# Patient Record
Sex: MALE | URBAN - METROPOLITAN AREA
[De-identification: names, ages, dates, MRNs, and addresses within clinical notes are randomized per-mention and may not be internally consistent; named-entity substitution may affect disease eponyms.]

---

## 2017-01-04 ENCOUNTER — IMPORTED ENCOUNTER (OUTPATIENT)
Dept: URBAN - METROPOLITAN AREA CLINIC 88 | Facility: CLINIC | Age: 62
End: 2017-01-04

## 2017-01-11 ENCOUNTER — IMPORTED ENCOUNTER (OUTPATIENT)
Dept: URBAN - METROPOLITAN AREA CLINIC 88 | Facility: CLINIC | Age: 62
End: 2017-01-11

## 2017-01-18 ENCOUNTER — IMPORTED ENCOUNTER (OUTPATIENT)
Dept: URBAN - METROPOLITAN AREA CLINIC 88 | Facility: CLINIC | Age: 62
End: 2017-01-18

## 2018-12-07 ENCOUNTER — OFFICE VISIT (OUTPATIENT)
Dept: INTERNAL MEDICINE CLINIC | Facility: CLINIC | Age: 63
End: 2018-12-07

## 2018-12-07 VITALS
WEIGHT: 242 LBS | SYSTOLIC BLOOD PRESSURE: 160 MMHG | HEIGHT: 77 IN | TEMPERATURE: 98.5 F | HEART RATE: 66 BPM | DIASTOLIC BLOOD PRESSURE: 83 MMHG | BODY MASS INDEX: 28.57 KG/M2 | RESPIRATION RATE: 16 BRPM

## 2018-12-07 DIAGNOSIS — E66.3 OVERWEIGHT (BMI 25.0-29.9): ICD-10-CM

## 2018-12-07 DIAGNOSIS — Z23 ENCOUNTER FOR IMMUNIZATION: ICD-10-CM

## 2018-12-07 DIAGNOSIS — F33.9 EPISODE OF RECURRENT MAJOR DEPRESSIVE DISORDER, UNSPECIFIED DEPRESSION EPISODE SEVERITY (HCC): Primary | ICD-10-CM

## 2018-12-07 DIAGNOSIS — I10 ESSENTIAL HYPERTENSION: ICD-10-CM

## 2018-12-07 RX ORDER — OMEPRAZOLE 40 MG/1
CAPSULE, DELAYED RELEASE ORAL
COMMUNITY
Start: 2017-04-28 | End: 2021-06-16 | Stop reason: ALTCHOICE

## 2018-12-07 RX ORDER — LISINOPRIL 10 MG/1
TABLET ORAL
Qty: 90 TAB | Refills: 2 | Status: SHIPPED | OUTPATIENT
Start: 2018-12-07 | End: 2019-01-16 | Stop reason: DRUGHIGH

## 2018-12-07 RX ORDER — LISINOPRIL 10 MG/1
TABLET ORAL
COMMUNITY
Start: 2017-05-29 | End: 2018-12-07 | Stop reason: SDUPTHER

## 2018-12-07 RX ORDER — BUPROPION HYDROCHLORIDE 300 MG/1
TABLET ORAL
COMMUNITY
Start: 2017-04-03 | End: 2018-12-07 | Stop reason: SDUPTHER

## 2018-12-07 RX ORDER — BUPROPION HYDROCHLORIDE 300 MG/1
TABLET ORAL
Qty: 90 TAB | Refills: 2 | Status: SHIPPED | OUTPATIENT
Start: 2018-12-07 | End: 2019-09-16 | Stop reason: SDUPTHER

## 2018-12-07 NOTE — PATIENT INSTRUCTIONS
DASH Diet: Care Instructions Your Care Instructions The DASH diet is an eating plan that can help lower your blood pressure. DASH stands for Dietary Approaches to Stop Hypertension. Hypertension is high blood pressure. The DASH diet focuses on eating foods that are high in calcium, potassium, and magnesium. These nutrients can lower blood pressure. The foods that are highest in these nutrients are fruits, vegetables, low-fat dairy products, nuts, seeds, and legumes. But taking calcium, potassium, and magnesium supplements instead of eating foods that are high in those nutrients does not have the same effect. The DASH diet also includes whole grains, fish, and poultry. The DASH diet is one of several lifestyle changes your doctor may recommend to lower your high blood pressure. Your doctor may also want you to decrease the amount of sodium in your diet. Lowering sodium while following the DASH diet can lower blood pressure even further than just the DASH diet alone. Follow-up care is a key part of your treatment and safety. Be sure to make and go to all appointments, and call your doctor if you are having problems. It's also a good idea to know your test results and keep a list of the medicines you take. How can you care for yourself at home? Following the DASH diet · Eat 4 to 5 servings of fruit each day. A serving is 1 medium-sized piece of fruit, ½ cup chopped or canned fruit, 1/4 cup dried fruit, or 4 ounces (½ cup) of fruit juice. Choose fruit more often than fruit juice. · Eat 4 to 5 servings of vegetables each day. A serving is 1 cup of lettuce or raw leafy vegetables, ½ cup of chopped or cooked vegetables, or 4 ounces (½ cup) of vegetable juice. Choose vegetables more often than vegetable juice. · Get 2 to 3 servings of low-fat and fat-free dairy each day. A serving is 8 ounces of milk, 1 cup of yogurt, or 1 ½ ounces of cheese. · Eat 6 to 8 servings of grains each day. A serving is 1 slice of bread, 1 ounce of dry cereal, or ½ cup of cooked rice, pasta, or cooked cereal. Try to choose whole-grain products as much as possible. · Limit lean meat, poultry, and fish to 2 servings each day. A serving is 3 ounces, about the size of a deck of cards. · Eat 4 to 5 servings of nuts, seeds, and legumes (cooked dried beans, lentils, and split peas) each week. A serving is 1/3 cup of nuts, 2 tablespoons of seeds, or ½ cup of cooked beans or peas. · Limit fats and oils to 2 to 3 servings each day. A serving is 1 teaspoon of vegetable oil or 2 tablespoons of salad dressing. · Limit sweets and added sugars to 5 servings or less a week. A serving is 1 tablespoon jelly or jam, ½ cup sorbet, or 1 cup of lemonade. · Eat less than 2,300 milligrams (mg) of sodium a day. If you limit your sodium to 1,500 mg a day, you can lower your blood pressure even more. Tips for success · Start small. Do not try to make dramatic changes to your diet all at once. You might feel that you are missing out on your favorite foods and then be more likely to not follow the plan. Make small changes, and stick with them. Once those changes become habit, add a few more changes. · Try some of the following: ? Make it a goal to eat a fruit or vegetable at every meal and at snacks. This will make it easy to get the recommended amount of fruits and vegetables each day. ? Try yogurt topped with fruit and nuts for a snack or healthy dessert. ? Add lettuce, tomato, cucumber, and onion to sandwiches. ? Combine a ready-made pizza crust with low-fat mozzarella cheese and lots of vegetable toppings. Try using tomatoes, squash, spinach, broccoli, carrots, cauliflower, and onions. ? Have a variety of cut-up vegetables with a low-fat dip as an appetizer instead of chips and dip. ? Sprinkle sunflower seeds or chopped almonds over salads.  Or try adding chopped walnuts or almonds to cooked vegetables. ? Try some vegetarian meals using beans and peas. Add garbanzo or kidney beans to salads. Make burritos and tacos with mashed rodriguez beans or black beans. Where can you learn more? Go to http://michelle-allie.info/. Enter H524 in the search box to learn more about \"DASH Diet: Care Instructions. \" Current as of: December 6, 2017 Content Version: 11.8 © 9239-6967 [employer]. Care instructions adapted under license by [employer] (which disclaims liability or warranty for this information). If you have questions about a medical condition or this instruction, always ask your healthcare professional. [address] any warranty or liability for your use of this information. High Blood Pressure: Care Instructions Your Care Instructions If your blood pressure is usually above 130/80, you have high blood pressure, or hypertension. That means the top number is 130 or higher or the bottom number is 80 or higher, or both. Despite what a lot of people think, high blood pressure usually doesn't cause headaches or make you feel dizzy or lightheaded. It usually has no symptoms. But it does increase your risk for heart attack, stroke, and kidney or eye damage. The higher your blood pressure, the more your risk increases. Your doctor will give you a goal for your blood pressure. Your goal will be based on your health and your age. Lifestyle changes, such as eating healthy and being active, are always important to help lower blood pressure. You might also take medicine to reach your blood pressure goal. 
Follow-up care is a key part of your treatment and safety. Be sure to make and go to all appointments, and call your doctor if you are having problems. It's also a good idea to know your test results and keep a list of the medicines you take. How can you care for yourself at home? Medical treatment · If you stop taking your medicine, your blood pressure will go back up. You may take one or more types of medicine to lower your blood pressure. Be safe with medicines. Take your medicine exactly as prescribed. Call your doctor if you think you are having a problem with your medicine. · Talk to your doctor before you start taking aspirin every day. Aspirin can help certain people lower their risk of a heart attack or stroke. But taking aspirin isn't right for everyone, because it can cause serious bleeding. · See your doctor regularly. You may need to see the doctor more often at first or until your blood pressure comes down. · If you are taking blood pressure medicine, talk to your doctor before you take decongestants or anti-inflammatory medicine, such as ibuprofen. Some of these medicines can raise blood pressure. · Learn how to check your blood pressure at home. Lifestyle changes · Stay at a healthy weight. This is especially important if you put on weight around the waist. Losing even 10 pounds can help you lower your blood pressure. · If your doctor recommends it, get more exercise. Walking is a good choice. Bit by bit, increase the amount you walk every day. Try for at least 30 minutes on most days of the week. You also may want to swim, bike, or do other activities. · Avoid or limit alcohol. Talk to your doctor about whether you can drink any alcohol. · Try to limit how much sodium you eat to less than 2,300 milligrams (mg) a day. Your doctor may ask you to try to eat less than 1,500 mg a day. · Eat plenty of fruits (such as bananas and oranges), vegetables, legumes, whole grains, and low-fat dairy products. · Lower the amount of saturated fat in your diet. Saturated fat is found in animal products such as milk, cheese, and meat. Limiting these foods may help you lose weight and also lower your risk for heart disease. · Do not smoke. Smoking increases your risk for heart attack and stroke. If you need help quitting, talk to your doctor about stop-smoking programs and medicines. These can increase your chances of quitting for good. When should you call for help? Call 911 anytime you think you may need emergency care. This may mean having symptoms that suggest that your blood pressure is causing a serious heart or blood vessel problem. Your blood pressure may be over 180/120. 
 For example, call 911 if: 
  · You have symptoms of a heart attack. These may include: 
? Chest pain or pressure, or a strange feeling in the chest. 
? Sweating. ? Shortness of breath. ? Nausea or vomiting. ? Pain, pressure, or a strange feeling in the back, neck, jaw, or upper belly or in one or both shoulders or arms. ? Lightheadedness or sudden weakness. ? A fast or irregular heartbeat.  
  · You have symptoms of a stroke. These may include: 
? Sudden numbness, tingling, weakness, or loss of movement in your face, arm, or leg, especially on only one side of your body. ? Sudden vision changes. ? Sudden trouble speaking. ? Sudden confusion or trouble understanding simple statements. ? Sudden problems with walking or balance. ? A sudden, severe headache that is different from past headaches.  
  · You have severe back or belly pain.  
 Do not wait until your blood pressure comes down on its own. Get help right away. 
 Call your doctor now or seek immediate care if: 
  · Your blood pressure is much higher than normal (such as 180/120 or higher), but you don't have symptoms.  
  · You think high blood pressure is causing symptoms, such as: 
? Severe headache. 
? Blurry vision.  
 Watch closely for changes in your health, and be sure to contact your doctor if: 
  · Your blood pressure measures higher than your doctor recommends at least 2 times. That means the top number is higher or the bottom number is higher, or both.  
  · You think you may be having side effects from your blood pressure medicine. Where can you learn more? Go to http://michelle-allie.info/. Enter A716 in the search box to learn more about \"High Blood Pressure: Care Instructions. \" Current as of: December 6, 2017 Content Version: 11.8 © 2060-8875 [employer]. Care instructions adapted under license by [employer] (which disclaims liability or warranty for this information). If you have questions about a medical condition or this instruction, always ask your healthcare professional. [address] any warranty or liability for your use of this information. Learning About the [address] Diet What is the Mediterranean diet? The Mediterranean diet is a style of eating rather than a diet plan. It features foods eaten in [place], [place], [place], and other countries along the [facility]. It emphasizes eating foods like fish, fruits, vegetables, beans, high-fiber breads and whole grains, nuts, and olive oil. This style of eating includes limited red meat, cheese, and sweets. Why choose the Mediterranean diet? A Mediterranean-style diet may improve heart health. It contains more fat than other heart-healthy diets. But the fats are mainly from nuts, unsaturated oils (such as fish oils and olive oil), and certain nut or seed oils (such as canola, soybean, or flaxseed oil). These fats may help protect the heart and blood vessels. How can you get started on the Mediterranean diet? Here are some things you can do to switch to a more Mediterranean way of eating. What to eat · Eat a variety of fruits and vegetables each day, such as grapes, blueberries, tomatoes, broccoli, peppers, figs, olives, spinach, eggplant, beans, lentils, and chickpeas. · Eat a variety of whole-grain foods each day, such as oats, brown rice, and whole wheat bread, pasta, and couscous. · Eat fish at least 2 times a week.  Try tuna, salmon, mackerel, lake trout, herring, or sardines. · Eat moderate amounts of low-fat dairy products, such as milk, cheese, or yogurt. · Eat moderate amounts of poultry and eggs. · Choose healthy (unsaturated) fats, such as nuts, olive oil, and certain nut or seed oils like canola, soybean, and flaxseed. · Limit unhealthy (saturated) fats, such as butter, palm oil, and coconut oil. And limit fats found in animal products, such as meat and dairy products made with whole milk. Try to eat red meat only a few times a month in very small amounts. · Limit sweets and desserts to only a few times a week. This includes sugar-sweetened drinks like soda. The Mediterranean diet may also include red wine with your meal1 glass each day for women and up to 2 glasses a day for men. Tips for eating at home · Use herbs, spices, garlic, lemon zest, and citrus juice instead of salt to add flavor to foods. · Add avocado slices to your sandwich instead of jaramillo. · Have fish for lunch or dinner instead of red meat. Brush the fish with olive oil, and broil or grill it. · Sprinkle your salad with seeds or nuts instead of cheese. · Cook with olive or canola oil instead of butter or oils that are high in saturated fat. · Switch from 2% milk or whole milk to 1% or fat-free milk. · Dip raw vegetables in a vinaigrette dressing or hummus instead of dips made from mayonnaise or sour cream. 
· Have a piece of fruit for dessert instead of a piece of cake. Try baked apples, or have some dried fruit. Tips for eating out · Try broiled, grilled, baked, or poached fish instead of having it fried or breaded. · Ask your  to have your meals prepared with olive oil instead of butter. · Order dishes made with marinara sauce or sauces made from olive oil. Avoid sauces made from cream or mayonnaise. · Choose whole-grain breads, whole wheat pasta and pizza crust, brown rice, beans, and lentils. · Cut back on butter or margarine on bread. Instead, you can dip your bread in a small amount of olive oil. · Ask for a side salad or grilled vegetables instead of french fries or chips. Where can you learn more? Go to http://michelle-allie.info/. Enter [phone] in the search box to learn more about \"Learning About the Mediterranean Diet. \" Current as of: March 29, 2018 Content Version: 11.8 © 9061-1491 [employer]. Care instructions adapted under license by [employer] (which disclaims liability or warranty for this information). If you have questions about a medical condition or this instruction, always ask your healthcare professional. [address] any warranty or liability for your use of this information. Starting a Weight Loss Plan: Care Instructions Your Care Instructions If you are thinking about losing weight, it can be hard to know where to start. Your doctor can help you set up a weight loss plan that best meets your needs. You may want to take a class on nutrition or exercise, or join a weight loss support group. If you have questions about how to make changes to your eating or exercise habits, ask your doctor about seeing a registered dietitian or an exercise specialist. 
It can be a big challenge to lose weight. But you do not have to make huge changes at once. Make small changes, and stick with them. When those changes become habit, add a few more changes. If you do not think you are ready to make changes right now, try to pick a date in the future. Make an appointment to see your doctor to discuss whether the time is right for you to start a plan. Follow-up care is a key part of your treatment and safety. Be sure to make and go to all appointments, and call your doctor if you are having problems. It's also a good idea to know your test results and keep a list of the medicines you take. How can you care for yourself at home? · Set realistic goals. Many people expect to lose much more weight than is likely. A weight loss of 5% to 10% of your body weight may be enough to improve your health. · Get family and friends involved to provide support. Talk to them about why you are trying to lose weight, and ask them to help. They can help by participating in exercise and having meals with you, even if they may be eating something different. · Find what works best for you. If you do not have time or do not like to cook, a program that offers meal replacement bars or shakes may be better for you. Or if you like to prepare meals, finding a plan that includes daily menus and recipes may be best. 
· Ask your doctor about other health professionals who can help you achieve your weight loss goals. ? A dietitian can help you make healthy changes in your diet. ? An exercise specialist or  can help you develop a safe and effective exercise program. 
? A counselor or psychiatrist can help you cope with issues such as depression, anxiety, or family problems that can make it hard to focus on weight loss. · Consider joining a support group for people who are trying to lose weight. Your doctor can suggest groups in your area. Where can you learn more? Go to http://michelle-allie.info/. Enter U566 in the search box to learn more about \"Starting a Weight Loss Plan: Care Instructions. \" Current as of: June 26, 2018 Content Version: 11.8 © 5849-7975 [employer]wise, [employer]. Care instructions adapted under license by [employer] (which disclaims liability or warranty for this information). If you have questions about a medical condition or this instruction, always ask your healthcare professional. [address] any warranty or liability for your use of this information.

## 2018-12-07 NOTE — PROGRESS NOTES
Subjective:  
  
Juanita Monsalve is a 58 y.o. male who is a new patient and is here to establish care. Previous followed by PCP but he does not remember who. The following sections were reviewed & updated as appropriate: PMH, PL, PSH, FH, RxH, and SH. Mental Health Review Patient is seen for depression, bipolar. He was previously on lithium, celexa, and wellbutrin. He continued only on the wellbutin and states this has controlled his symptoms. Ongoing symptoms include depressed mood. He states he has been off all his meds since the summer. Cardiovascular Review The patient has hypertension. He reports no TIA's, no chest pain on exertion, no dyspnea on exertion, no swelling of ankles, no palpitations. Labs: no lab studies available for review at time of visit, he states he had a physical recently with labs and will get us his records. He states was seen in the past year by a cardiologist and had stress testing, echo done. Patient Active Problem List  
 Diagnosis Date Noted  Episode of recurrent major depressive disorder ([facility] 12/07/2018  Essential hypertension 12/07/2018 Current Outpatient Medications Medication Sig Dispense Refill  omeprazole (PRILOSEC) 40 mg capsule TAKE 1 CAPSULE(S) EVERY DAY BY ORAL ROUTE  DAYS.  buPROPion XL (WELLBUTRIN XL) 300 mg XL tablet TAKE 1 TABLET BY MOUTH EVERY MORNING 90 Tab 2  
 lisinopril (PRINIVIL, ZESTRIL) 10 mg tablet TAKE 1 TABLET(S) EVERY DAY BY ORAL ROUTE. 90 Tab 2 Allergies Allergen Reactions  Pcn [Penicillins] Unknown (comments) History reviewed. No pertinent past medical history. Past Surgical History:  
Procedure Laterality Date  HX COLECTOMY  HX ROTATOR CUFF REPAIR Review of Systems A comprehensive review of systems was negative except for that written in the HPI. Objective:  
 
Visit Vitals /83 (BP 1 Location: Left arm, BP Patient Position: Sitting) Pulse 66  
 Temp 98.5 °F (36.9 °C) (Oral) Resp 16 Ht 6' 5\" (1.956 m) Wt 242 lb (109.8 kg) BMI 28.70 kg/m² General appearance: alert, cooperative, no distress, appears stated age Head: Normocephalic, without obvious abnormality, atraumatic Eyes: negative Back: symmetric, no curvature. ROM normal. No CVA tenderness. Lungs: clear to auscultation bilaterally Heart: regular rate and rhythm, S1, S2 normal, no murmur, click, rub or gallop Extremities: extremities normal, atraumatic, no cyanosis or edema Pulses: 2+ and symmetric Skin: Skin color, texture, turgor normal. No rashes or lesions Neurologic: Alert and oriented X 3, normal strength and tone. Normal symmetric reflexes. Normal coordination and gait Psych: appropriate mood, speech, affect Nursing note and vitals reviewed Assessment/Plan: ICD-10-CM ICD-9-CM 1. Episode of recurrent major depressive disorder, unspecified depression episode severity (HCC) F33.9 296.30 buPROPion XL (WELLBUTRIN XL) 300 mg XL tablet 2. Essential hypertension I10 401.9 lisinopril (PRINIVIL, ZESTRIL) 10 mg tablet 3. Encounter for immunization Z23 V03.89 INFLUENZA VIRUS VAC QUAD,SPLIT,PRESV FREE SYRINGE IM 4. Overweight (BMI 25.0-29. 9) E66.3 278.02 Follow-up Disposition: 
Return in about 4 weeks (around 1/4/2019) for Hypertension, Please sign record release forms at . Advised him to call back or return to office if symptoms worsen/change/persist. 
Discussed expected course/resolution/complications of diagnosis in detail with patient. Medication risks/benefits/costs/interactions/alternatives discussed with patient. He was given an after visit summary which includes diagnoses, current medications, & vitals. He expressed understanding with the diagnosis and plan.

## 2018-12-07 NOTE — PROGRESS NOTES
Chief Complaint Patient presents with  Establish Care  
  would like to get refills on buproprion 300mg. 1. Have you been to the ER, urgent care clinic since your last visit? Hospitalized since your last visit? No 
 
2. Have you seen or consulted any other health care providers outside of the [address] [place] [address] since your last visit? Include any pap smears or colon screening.  No

## 2019-01-10 ENCOUNTER — OFFICE VISIT (OUTPATIENT)
Dept: INTERNAL MEDICINE CLINIC | Facility: CLINIC | Age: 64
End: 2019-01-10

## 2019-01-10 VITALS
DIASTOLIC BLOOD PRESSURE: 84 MMHG | SYSTOLIC BLOOD PRESSURE: 142 MMHG | RESPIRATION RATE: 18 BRPM | OXYGEN SATURATION: 98 % | HEIGHT: 77 IN | WEIGHT: 244 LBS | BODY MASS INDEX: 28.81 KG/M2 | HEART RATE: 73 BPM | TEMPERATURE: 98.3 F

## 2019-01-10 DIAGNOSIS — G57.60 MORTON'S NEUROMA, UNSPECIFIED LATERALITY: ICD-10-CM

## 2019-01-10 DIAGNOSIS — I10 ESSENTIAL HYPERTENSION: Primary | ICD-10-CM

## 2019-01-10 NOTE — PROGRESS NOTES
Subjective:      Enrike Armendariz is a 58 y.o. male who presents today for follow up. Cardiovascular Review  The patient has hypertension. Since last visit: no changes. He reports taking medications as instructed, no medication side effects noted. Diet and Lifestyle: generally follows a low fat low cholesterol diet, generally follows a low sodium diet, no formal exercise but active during the day. Pressures at home are in the 130s. Labs: no lab studies available for review at time of visit, reviewed and discussed with patient. BP Readings from Last 3 Encounters:   01/10/19 142/84   12/07/18 160/83     Body mass index is 28.93 kg/m². he is starting to work on weight loss. He has \"Mortons\" toe. He states this has greatly affected his ability to exercise. Wt Readings from Last 3 Encounters:   01/10/19 244 lb (110.7 kg)   12/07/18 242 lb (109.8 kg)           Patient Active Problem List    Diagnosis Date Noted    Episode of recurrent major depressive disorder ([facility] [address] 12/07/2018    Essential hypertension 12/07/2018     Current Outpatient Medications   Medication Sig Dispense Refill    omeprazole (PRILOSEC) 40 mg capsule TAKE 1 CAPSULE(S) EVERY DAY BY ORAL ROUTE  DAYS.  buPROPion XL (WELLBUTRIN XL) 300 mg XL tablet TAKE 1 TABLET BY MOUTH EVERY MORNING 90 Tab 2    lisinopril (PRINIVIL, ZESTRIL) 10 mg tablet TAKE 1 TABLET(S) EVERY DAY BY ORAL ROUTE. 90 Tab 2     Allergies   Allergen Reactions    Pcn [Penicillins] Unknown (comments)     No past medical history on file. Past Surgical History:   Procedure Laterality Date    HX COLECTOMY      HX ROTATOR CUFF REPAIR          Review of Systems    A comprehensive review of systems was negative except for that written in the HPI.      Objective:     Visit Vitals  /84   Pulse 73   Temp 98.3 °F (36.8 °C) (Oral)   Resp 18   Ht 6' 5\" (1.956 m)   Wt 244 lb (110.7 kg)   SpO2 98%   BMI 28.93 kg/m²     General appearance: alert, cooperative, no distress, appears stated age  Head: Normocephalic, without obvious abnormality, atraumatic  Eyes: negative  Lungs: clear to auscultation bilaterally  Chest wall: no tenderness  Heart: regular rate and rhythm, S1, S2 normal, no murmur, click, rub or gallop  Extremities: extremities normal, atraumatic, no cyanosis or edema  Pulses: 2+ and symmetric  Neurologic: Alert and oriented X 3, normal strength and tone. Normal symmetric reflexes. Normal coordination and gait  Nursing note and vitals reviewed  Assessment/Plan:       ICD-10-CM ICD-9-CM    1. Essential hypertension I10 401.9    2. Prieto's neuroma, unspecified laterality G57.60 355.6 REFERRAL TO PODIATRY   BP is well controlled at home, BP was taken today after elevator was broken and he had to walk up 4 flights. Discussed risk/benefits of increasing his medication or starting new rx. He will monitor pressure at home and work on weight loss. Close monitoring is advised and he will use GELI to communicate pressures    Follow-up Disposition:  Return in about 4 weeks (around 2/7/2019) for Please message me your pressures, Schedule your annual physical with fasting labs. Advised him to call back or return to office if symptoms worsen/change/persist.  Discussed expected course/resolution/complications of diagnosis in detail with patient. Medication risks/benefits/costs/interactions/alternatives discussed with patient. He was given an after visit summary which includes diagnoses, current medications, & vitals. He expressed understanding with the diagnosis and plan.

## 2019-01-10 NOTE — PROGRESS NOTES
Tom Mobley is a 58 y.o. male    Chief Complaint   Patient presents with    Hypertension     1. Have you been to the ER, urgent care clinic since your last visit? Hospitalized since your last visit? No     2. Have you seen or consulted any other health care providers outside of the [address] [place] [address] since your last visit? Include any pap smears or colon screening.   No     Visit Vitals  /84   Pulse 73   Temp 98.3 °F (36.8 °C) (Oral)   Resp 18   Ht 6' 5\" (1.956 m)   Wt 244 lb (110.7 kg)   SpO2 98%   BMI 28.93 kg/m²

## 2019-01-16 DIAGNOSIS — I10 ESSENTIAL HYPERTENSION: Primary | ICD-10-CM

## 2019-01-16 RX ORDER — LISINOPRIL 20 MG/1
20 TABLET ORAL DAILY
Qty: 90 TAB | Refills: 2 | Status: SHIPPED | OUTPATIENT
Start: 2019-01-16 | End: 2019-09-16 | Stop reason: SDUPTHER

## 2019-02-12 ENCOUNTER — DOCUMENTATION ONLY (OUTPATIENT)
Dept: INTERNAL MEDICINE CLINIC | Facility: CLINIC | Age: 64
End: 2019-02-12

## 2019-02-12 DIAGNOSIS — Z90.49 S/P COLON RESECTION: ICD-10-CM

## 2019-02-12 DIAGNOSIS — Z86.010 HISTORY OF COLON POLYPS: ICD-10-CM

## 2019-02-12 PROBLEM — K22.70 BARRETT ESOPHAGUS: Status: ACTIVE | Noted: 2019-02-12

## 2019-07-16 ENCOUNTER — OFFICE VISIT (OUTPATIENT)
Dept: INTERNAL MEDICINE CLINIC | Facility: CLINIC | Age: 64
End: 2019-07-16

## 2019-07-16 VITALS
RESPIRATION RATE: 16 BRPM | HEART RATE: 81 BPM | WEIGHT: 237 LBS | DIASTOLIC BLOOD PRESSURE: 72 MMHG | OXYGEN SATURATION: 97 % | HEIGHT: 77 IN | BODY MASS INDEX: 27.98 KG/M2 | SYSTOLIC BLOOD PRESSURE: 120 MMHG | TEMPERATURE: 98.5 F

## 2019-07-16 DIAGNOSIS — M25.512 CHRONIC LEFT SHOULDER PAIN: ICD-10-CM

## 2019-07-16 DIAGNOSIS — J06.9 VIRAL URI WITH COUGH: Primary | ICD-10-CM

## 2019-07-16 DIAGNOSIS — G89.29 CHRONIC LEFT SHOULDER PAIN: ICD-10-CM

## 2019-07-16 NOTE — PROGRESS NOTES
Subjective:      Leonela Garcia is a 71 y.o. male who presents today for acute care. Cough: symptoms started early spring, they include cough with nasal congestion. He notes he traveled to [facility] and returned last week. Since then he notes dry cough, SOB, chest congestion, wheezing. He denies fever, chills, sweats. He has not tried anything for his symptoms. He notes symptoms are starting to improve. Sick contacts: airplane travel. Left shoulder pain: he requests referral to ortho. He notes left shoulder pain and states he has had previous issues with a rotator cuff. 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 90 Tab 2    omeprazole (PRILOSEC) 40 mg capsule TAKE 1 CAPSULE(S) EVERY DAY BY ORAL ROUTE  DAYS.  buPROPion XL (WELLBUTRIN XL) 300 mg XL tablet TAKE 1 TABLET BY MOUTH EVERY MORNING 90 Tab 2     Allergies   Allergen Reactions    Pcn [Penicillins] Unknown (comments)     History reviewed. No pertinent past medical history. Past Surgical History:   Procedure Laterality Date    HX COLECTOMY      HX ROTATOR CUFF REPAIR          Review of Systems    A comprehensive review of systems was negative except for that written in the HPI.      Objective:     Visit Vitals  /72   Pulse 81   Temp 98.5 °F (36.9 °C) (Oral)   Resp 16   Ht 6' 5\" (1.956 m)   Wt 237 lb (107.5 kg)   SpO2 97%   BMI 28.10 kg/m²     General appearance: alert, cooperative, no distress, appears stated age  Head: Normocephalic, without obvious abnormality, atraumatic  Eyes: negative  Ears: normal TM's, left canal with significant cerumen build up  Nose: Nares normal. Septum midline. Mucosa normal. No drainage or sinus tenderness. Throat: Lips, mucosa, and tongue normal. Teeth and gums normal  Neck: supple, symmetrical, trachea midline and no adenopathy  Extremities: left shoulder with loss of ROM  Lungs: clear to auscultation bilaterally  Heart: regular rate and rhythm, S1, S2 normal, no murmur, click, rub or gallop  Neurologic: Grossly normal  Nursing note and vitals reviewed  Assessment/Plan:       ICD-10-CM ICD-9-CM    1. Viral URI with cough J06.9 465.9     B97.89     2. Chronic left shoulder pain M25.512 719.41 REFERRAL TO ORTHOPEDIC SURGERY    G89.29 338.29    Mucinex and zyrtec started, if no improvement in 48hrs he will call or message office       Follow-up and Dispositions    · Return if symptoms worsen or fail to improve. Advised him to call back or return to office if symptoms worsen/change/persist.  Discussed expected course/resolution/complications of diagnosis in detail with patient. Medication risks/benefits/costs/interactions/alternatives discussed with patient. He was given an after visit summary which includes diagnoses, current medications, & vitals. He expressed understanding with the diagnosis and plan.

## 2019-07-16 NOTE — PROGRESS NOTES
Chief Complaint   Patient presents with    Cough     cough and congestion and runny nose since early spring. 1. Have you been to the ER, urgent care clinic since your last visit? Hospitalized since your last visit? No    2. Have you seen or consulted any other health care providers outside of the [address] [place] [address] since your last visit? Include any pap smears or colon screening.  No

## 2019-07-16 NOTE — PATIENT INSTRUCTIONS
Start mucinex at 1,200mg twice daily with TONS of water. Also take zyrtec 10mg at night. If no improvement in symptoms in 48 hours please let me know. Viral Respiratory Infection: Care Instructions  Your Care Instructions    Viruses are very small organisms. They grow in number after they enter your body. There are many types that cause different illnesses, such as colds and the mumps. The symptoms of a viral respiratory infection often start quickly. They include a fever, sore throat, and runny nose. You may also just not feel well. Or you may not want to eat much. Most viral respiratory infections are not serious. They usually get better with time and self-care. Antibiotics are not used to treat a viral infection. That's because antibiotics will not help cure a viral illness. In some cases, antiviral medicine can help your body fight a serious viral infection. Follow-up care is a key part of your treatment and safety. Be sure to make and go to all appointments, and call your doctor if you are having problems. It's also a good idea to know your test results and keep a list of the medicines you take. How can you care for yourself at home? · Rest as much as possible until you feel better. · Be safe with medicines. Take your medicine exactly as prescribed. Call your doctor if you think you are having a problem with your medicine. You will get more details on the specific medicine your doctor prescribes. · Take an over-the-counter pain medicine, such as acetaminophen (Tylenol), ibuprofen (Advil, Motrin), or naproxen (Aleve), as needed for pain and fever. Read and follow all instructions on the label. Do not give aspirin to anyone younger than 20. It has been linked to Reye syndrome, a serious illness. · Drink plenty of fluids, enough so that your urine is light yellow or clear like water. Hot fluids, such as tea or soup, may help relieve congestion in your nose and throat.  If you have kidney, heart, or liver disease and have to limit fluids, talk with your doctor before you increase the amount of fluids you drink. · Try to clear mucus from your lungs by breathing deeply and coughing. · Gargle with warm salt water once an hour. This can help reduce swelling and throat pain. Use 1 teaspoon of salt mixed in 1 cup of warm water. · Do not smoke or allow others to smoke around you. If you need help quitting, talk to your doctor about stop-smoking programs and medicines. These can increase your chances of quitting for good. To avoid spreading the virus  · Cough or sneeze into a tissue. Then throw the tissue away. · If you don't have a tissue, use your hand to cover your cough or sneeze. Then clean your hand. You can also cough into your sleeve. · Wash your hands often. Use soap and warm water. Wash for 15 to 20 seconds each time. · If you don't have soap and water near you, you can clean your hands with alcohol wipes or gel. When should you call for help? Call your doctor now or seek immediate medical care if:    · You have a new or higher fever.     · Your fever lasts more than 48 hours.     · You have trouble breathing.     · You have a fever with a stiff neck or a severe headache.     · You are sensitive to light.     · You feel very sleepy or confused.    Watch closely for changes in your health, and be sure to contact your doctor if:    · You do not get better as expected. Where can you learn more? Go to http://michelle-allie.info/. Enter M472 in the search box to learn more about \"Viral Respiratory Infection: Care Instructions. \"  Current as of: September 5, 2018  Content Version: 11.9  © 2040-3424 [employer]. Care instructions adapted under license by [employer] (which disclaims liability or warranty for this information).  If you have questions about a medical condition or this instruction, always ask your healthcare professional. Mounika Ferguson disclaims any warranty or liability for your use of this information.

## 2019-09-16 ENCOUNTER — OFFICE VISIT (OUTPATIENT)
Dept: INTERNAL MEDICINE CLINIC | Age: 64
End: 2019-09-16

## 2019-09-16 VITALS
SYSTOLIC BLOOD PRESSURE: 140 MMHG | HEIGHT: 77 IN | WEIGHT: 230 LBS | DIASTOLIC BLOOD PRESSURE: 84 MMHG | HEART RATE: 80 BPM | RESPIRATION RATE: 16 BRPM | TEMPERATURE: 98 F | BODY MASS INDEX: 27.16 KG/M2 | OXYGEN SATURATION: 99 %

## 2019-09-16 DIAGNOSIS — Z71.89 ACP (ADVANCE CARE PLANNING): ICD-10-CM

## 2019-09-16 DIAGNOSIS — Z11.59 ENCOUNTER FOR HEPATITIS C SCREENING TEST FOR LOW RISK PATIENT: ICD-10-CM

## 2019-09-16 DIAGNOSIS — Z23 NEED FOR TDAP VACCINATION: ICD-10-CM

## 2019-09-16 DIAGNOSIS — E66.3 OVERWEIGHT (BMI 25.0-29.9): ICD-10-CM

## 2019-09-16 DIAGNOSIS — Z00.00 INITIAL MEDICARE ANNUAL WELLNESS VISIT: Primary | ICD-10-CM

## 2019-09-16 DIAGNOSIS — Z12.5 PROSTATE CANCER SCREENING: ICD-10-CM

## 2019-09-16 DIAGNOSIS — R35.1 NOCTURIA: ICD-10-CM

## 2019-09-16 DIAGNOSIS — I10 ESSENTIAL HYPERTENSION: ICD-10-CM

## 2019-09-16 DIAGNOSIS — F33.9 EPISODE OF RECURRENT MAJOR DEPRESSIVE DISORDER, UNSPECIFIED DEPRESSION EPISODE SEVERITY (HCC): ICD-10-CM

## 2019-09-16 DIAGNOSIS — Z23 NEED FOR SHINGLES VACCINE: ICD-10-CM

## 2019-09-16 RX ORDER — ASPIRIN 81 MG/1
81 TABLET ORAL DAILY
Qty: 30 TAB | Refills: 0
Start: 2019-09-16

## 2019-09-16 RX ORDER — BUPROPION HYDROCHLORIDE 300 MG/1
TABLET ORAL
Qty: 90 TAB | Refills: 3 | Status: SHIPPED | OUTPATIENT
Start: 2019-09-16 | End: 2021-01-04

## 2019-09-16 RX ORDER — LISINOPRIL 20 MG/1
20 TABLET ORAL DAILY
Qty: 90 TAB | Refills: 3 | Status: SHIPPED | OUTPATIENT
Start: 2019-09-16 | End: 2021-06-18 | Stop reason: SDUPTHER

## 2019-09-16 NOTE — ACP (ADVANCE CARE PLANNING)
Advance Care Planning (ACP) Provider Conversation Snapshot    Date of ACP Conversation: 09/16/19  Persons included in Conversation:  patient  Length of ACP Conversation in minutes:  <16 minutes (Non-Billable)    Authorized Decision Maker (if patient is incapable of making informed decisions): This person is: Other Legally Authorized Decision Maker (e.g. Next of Kin)            For Patients with Decision Making Capacity:   Values/Goals: Exploration of values, goals, and preferences if recovery is not expected, even with continued medical treatment in the event of:  Imminent death  Severe, permanent brain injury  \"In these circumstances, what matters most to you? \"  he has not thought about these issues and wants to take time to explore them with his family    Conversation Outcomes / Follow-Up Plan:   Recommended completion of Advance Directive form after review of ACP materials and conversation with prospective healthcare agent

## 2019-09-16 NOTE — PROGRESS NOTES
Subjective:      Kimmy Gore is a 71 y.o. male who presents today for medicare wellness. Annual Wellness Visit- Initial Visit    End of Life Planning: This was discussed with him today and he  has NO advanced directive  - add't info provided. Reviewed DNR/DNI and patient requests more info. Depression Screen:   3 most recent PHQ Screens 9/16/2019   PHQ Not Done -   Little interest or pleasure in doing things Not at all   Feeling down, depressed, irritable, or hopeless Not at all   Total Score PHQ 2 0       Fall Risk:   Fall Risk Assessment, last 12 mths 9/16/2019   Able to walk? Yes   Fall in past 12 months? No       Abuse Screen:  Abuse Screening Questionnaire 9/16/2019   Do you ever feel afraid of your partner? N   Are you in a relationship with someone who physically or mentally threatens you? N   Is it safe for you to go home? Y         Vision Screen (IPPE Only):  Vision is: Good Cataract surgery bilaterally 10 years ago     Alcohol Risk Factor Screening: On any occasion during the past 3 months, have you had more than 4 drinks containing alcohol? No  Do you average more than 14 drinks per week? No    Hearing Loss:  Hearing is good. Activities of Daily Living:  Self-care. Requires assistance with: no ADLs  Exercise: very active    Cognition Screen:   appropriate decision making    Adult Nutrition Screen:  No risk factors noted. Health Maintenance  Daily Aspirin: recommended to start daily 81mg  AAA Screening: n/a  Glaucoma Screening: patient advised to schedule this. Immunizations:     Influenza: n/a. Tetanus: not UTD- script provided. Shingles: due for Shingrix - script provided. Pneumonia: due for Prevnar & script provided. Cancer screening:    Colon: reviewed guidelines, up to date. Prostate: reviewed guidelines, order placed. He notes intermittent nocturia that correlates with diet and fluid intake. Body mass index is 27.34 kg/m².   Wt Readings from Last 3 Encounters:   09/16/19 230 lb (104.3 kg)   07/16/19 237 lb (107.5 kg)   01/10/19 244 lb (110.7 kg)       Shoulder pain: he notes this has been a long standing issue that recently got worse, he notes persistent shoulder pain. He will follow up with ortho. Patient Care Team:  Skiff, Dimitri Gloss, NP as PCP - General (Nurse Practitioner)       The following sections were reviewed & updated as appropriate: PMH, PSH, FH, and SH. Patient Active Problem List   Diagnosis Code    Episode of recurrent major depressive disorder ([facility] [address] F33.9    Essential hypertension I10    Baldwin esophagus K22.70    History of colon polyps Z86.010    S/P colon resection Z90.49          Prior to Admission medications    Medication Sig Start Date End Date Taking? Authorizing Provider   lisinopril (PRINIVIL, ZESTRIL) 20 mg tablet Take 1 Tab by mouth daily. 1/16/19   Skiff, Dimitri Gloss, NP   omeprazole (PRILOSEC) 40 mg capsule TAKE 1 CAPSULE(S) EVERY DAY BY ORAL ROUTE  DAYS. 4/28/17   Provider, Historical   buPROPion XL (WELLBUTRIN XL) 300 mg XL tablet TAKE 1 TABLET BY MOUTH EVERY MORNING 12/7/18   Skiff, Dimitri Gloss, NP          Allergies   Allergen Reactions    Pcn [Penicillins] Unknown (comments)          Past Surgical History:   Procedure Laterality Date    HX COLECTOMY      HX ROTATOR CUFF REPAIR       Family History   Problem Relation Age of Onset    Heart Failure Mother     Heart Failure Father      Social History     Tobacco Use    Smoking status: Never Smoker    Smokeless tobacco: Never Used   Substance Use Topics    Alcohol use: No     Frequency: Never        Review of Systems    A comprehensive review of systems was negative except for that written in the HPI. Objective:     Visit Vitals  /84   Pulse 80   Temp 98 °F (36.7 °C) (Oral)   Resp 16   Ht 6' 4.9\" (1.953 m)   Wt 230 lb (104.3 kg)   SpO2 99%   BMI 27.34 kg/m²     General:  Alert, cooperative, no distress, appears stated age.    Head: Normocephalic, without obvious abnormality, atraumatic. Eyes:  Conjunctivae/corneas clear. PERRL, EOMs intact. Fundi benign   Ears:  Normal TMs and external ear canals both ears. Nose: Nares normal. Septum midline. Mucosa normal. No drainage or sinus tenderness. Throat: Lips, mucosa, and tongue normal. Teeth and gums normal.   Neck: Supple, symmetrical, trachea midline, no adenopathy, thyroid: no enlargement/tenderness/nodules, no carotid bruit and no JVD. Back:   Symmetric, no curvature. ROM normal. No CVA tenderness. Lungs:   Clear to auscultation bilaterally. Chest wall:  No tenderness or deformity. Heart:  Regular rate and rhythm, S1, S2 normal, no murmur, click, rub or gallop. Abdomen:   Soft, non-tender. Bowel sounds normal. No masses,  No organomegaly. Extremities: Extremities normal, atraumatic, no cyanosis or edema. Pulses: 2+ and symmetric all extremities. Skin: Skin color, texture, turgor normal. No rashes or lesions   Lymph nodes: Cervical, supraclavicular, and axillary nodes normal.   Neurologic: CNII-XII intact. Normal strength, sensation and reflexes throughout. Nursing note and vitals reviewed  Assessment/Plan:       ICD-10-CM ICD-9-CM    1. Initial Medicare annual wellness visit Z00.00 V70.0 pneumococcal 13 jj conj dip (PREVNAR-13) 0.5 mL syrg injection   2. ACP (advance care planning) Z71.89 V65.49    3. Essential hypertension I10 401.9 CBC WITH AUTOMATED DIFF      LIPID PANEL      METABOLIC PANEL, COMPREHENSIVE      aspirin delayed-release 81 mg tablet      lisinopril (PRINIVIL, ZESTRIL) 20 mg tablet   4. Nocturia R35.1 788.43 PSA, DIAGNOSTIC (PROSTATE SPECIFIC AG)   5. Need for shingles vaccine Z23 V04.89 varicella-zoster recombinant, PF, (SHINGRIX, PF,) 50 mcg/0.5 mL susr injection      DISCONTINUED: varicella-zoster recombinant, PF, (SHINGRIX, PF,) 50 mcg/0.5 mL susr injection   6.  Need for Tdap vaccination Z23 V06.1 diphtheria-pertussis, acellular,-tetanus (ACELL) 2.5-8-5 Lf-mcg-Lf/0.5mL susp susp      DISCONTINUED: diphtheria-pertussis, acellular,-tetanus (ACELL) 2.5-8-5 Lf-mcg-Lf/0.5mL susp susp   7. Prostate cancer screening Z12.5 V76.44 PSA, DIAGNOSTIC (PROSTATE SPECIFIC AG)   8. Encounter for hepatitis C screening test for low risk patient Z11.59 V73.89 HEPATITIS C QT BY PCR WITH REFLEX GENOTYPE   9. Overweight (BMI 25.0-29. 9) E66.3 278.02    10. Episode of recurrent major depressive disorder, unspecified depression episode severity ([facility] F33.9 296.30 buPROPion XL (WELLBUTRIN XL) 300 mg XL tablet          Follow-up and Dispositions    · Return for Get fasting labs drawn. Advised him to call back or return to office if symptoms worsen/change/persist.  Discussed expected course/resolution/complications of diagnosis in detail with patient. Medication risks/benefits/costs/interactions/alternatives discussed with patient. He was given an after visit summary which includes diagnoses, current medications, & vitals. He expressed understanding with the diagnosis and plan.

## 2019-09-16 NOTE — PATIENT INSTRUCTIONS
Learning About Med [facility]  What is a living will? A living will is a legal form you use to write down the kind of care you want at the end of your life. It is used by the health professionals who will treat you if you aren't able to decide for yourself. If you put your wishes in writing, your loved ones and others will know what kind of care you want. They won't need to guess. This can ease your mind and be helpful to others. A living will is not the same as an estate or property will. An estate will explains what you want to happen with your money and property after you die. Is a living will a legal document? A living will is a legal document. Each state has its own laws about living lemos. If you move to another state, make sure that your living will is legal in the state where you now live. Or you might use a universal form that has been approved by many states. This kind of form can sometimes be completed and stored online. Your electronic copy will then be available wherever you have a connection to the Internet. In most cases, doctors will respect your wishes even if you have a form from a different state. · You don't need an  to complete a living will. But legal advice can be helpful if your state's laws are unclear, your health history is complicated, or your family can't agree on what should be in your living will. · You can change your living will at any time. Some people find that their wishes about end-of-life care change as their health changes. · In addition to making a living will, think about completing a medical power of  form. This form lets you name the person you want to make end-of-life treatment decisions for you (your \"health care agent\") if you're not able to. Many hospitals and nursing homes will give you the forms you need to complete a living will and a medical power of .   · Your living will is used only if you can't make or communicate decisions for yourself anymore. If you become able to make decisions again, you can accept or refuse any treatment, no matter what you wrote in your living will. · Your state may offer an online registry. This is a place where you can store your living will online so the doctors and nurses who need to treat you can find it right away. What should you think about when creating a living will? Talk about your end-of-life wishes with your family members and your doctor. Let them know what you want. That way the people making decisions for you won't be surprised by your choices. Think about these questions as you make your living will:  · Do you know enough about life support methods that might be used? If not, talk to your doctor so you know what might be done if you can't breathe on your own, your heart stops, or you're unable to swallow. · What things would you still want to be able to do after you receive life-support methods? Would you want to be able to walk? To speak? To eat on your own? To live without the help of machines? · If you have a choice, where do you want to be cared for? In your home? At a hospital or nursing home? · Do you want certain Moravian practices performed if you become very ill? · If you have a choice at the end of your life, where would you prefer to die? At home? In a hospital or nursing home? Somewhere else? · Would you prefer to be buried or cremated? · Do you want your organs to be donated after you die? What should you do with your living will? · Make sure that your family members and your health care agent have copies of your living will. · Give your doctor a copy of your living will to keep in your medical record. If you have more than one doctor, make sure that each one has a copy. · You may want to put a copy of your living will where it can be easily found. Where can you learn more? Go to http://michelle-allie.info/.   Enter T223 in the search box to learn more about \"Learning About Living Davie Escobedo. \"  Current as of: April 1, 2019  Content Version: 12.1  © 2020-2541 [employer]. Care instructions adapted under license by [employer] (which disclaims liability or warranty for this information). If you have questions about a medical condition or this instruction, always ask your healthcare professional. [address] any warranty or liability for your use of this information. Advance Directives: Care Instructions  Your Care Instructions  An advance directive is a legal way to state your wishes at the end of your life. It tells your family and your doctor what to do if you can no longer say what you want. There are two main types of advance directives. You can change them any time that your wishes change. · A living will tells your family and your doctor your wishes about life support and other treatment. · A durable power of  for health care lets you name a person to make treatment decisions for you when you can't speak for yourself. This person is called a health care agent. If you do not have an advance directive, decisions about your medical care may be made by a doctor or a  who doesn't know you. It may help to think of an advance directive as a gift to the people who care for you. If you have one, they won't have to make tough decisions by themselves. Follow-up care is a key part of your treatment and safety. Be sure to make and go to all appointments, and call your doctor if you are having problems. It's also a good idea to know your test results and keep a list of the medicines you take. How can you care for yourself at home? · Discuss your wishes with your loved ones and your doctor. This way, there are no surprises. · Many states have a unique form. Or you might use a universal form that has been approved by many states. This kind of form can sometimes be completed and stored online.  Your electronic copy will then be available wherever you have a connection to the Internet. In most cases, doctors will respect your wishes even if you have a form from a different state. · You don't need a  to do an advance directive. But you may want to get legal advice. · Think about these questions when you prepare an advance directive:  ? Who do you want to make decisions about your medical care if you are not able to? Many people choose a family member or close friend. ? Do you know enough about life support methods that might be used? If not, talk to your doctor so you understand. ? What are you most afraid of that might happen? You might be afraid of having pain, losing your independence, or being kept alive by machines. ? Where would you prefer to die? Choices include your home, a hospital, or a nursing home. ? Would you like to have information about hospice care to support you and your family? ? Do you want to donate organs when you die? ? Do you want certain Hinduism practices performed before you die? If so, put your wishes in the advance directive. · Read your advance directive every year, and make changes as needed. When should you call for help? Be sure to contact your doctor if you have any questions. Where can you learn more? Go to http://michelle-allie.info/. Enter R264 in the search box to learn more about \"Advance Directives: Care Instructions. \"  Current as of: April 1, 2019  Content Version: 12.1  © 2255-4629 [employer]. Care instructions adapted under license by [employer] (which disclaims liability or warranty for this information). If you have questions about a medical condition or this instruction, always ask your healthcare professional. [address] any warranty or liability for your use of this information.

## 2021-06-14 ENCOUNTER — TELEPHONE (OUTPATIENT)
Dept: INTERNAL MEDICINE CLINIC | Age: 66
End: 2021-06-14

## 2021-06-16 ENCOUNTER — OFFICE VISIT (OUTPATIENT)
Dept: INTERNAL MEDICINE CLINIC | Age: 66
End: 2021-06-16
Payer: MEDICARE

## 2021-06-16 VITALS
TEMPERATURE: 98.4 F | RESPIRATION RATE: 16 BRPM | SYSTOLIC BLOOD PRESSURE: 170 MMHG | BODY MASS INDEX: 28.01 KG/M2 | HEIGHT: 77 IN | WEIGHT: 237.2 LBS | DIASTOLIC BLOOD PRESSURE: 110 MMHG | OXYGEN SATURATION: 98 % | HEART RATE: 72 BPM

## 2021-06-16 DIAGNOSIS — K21.9 GASTROESOPHAGEAL REFLUX DISEASE WITHOUT ESOPHAGITIS: ICD-10-CM

## 2021-06-16 DIAGNOSIS — F33.9 EPISODE OF RECURRENT MAJOR DEPRESSIVE DISORDER, UNSPECIFIED DEPRESSION EPISODE SEVERITY (HCC): ICD-10-CM

## 2021-06-16 DIAGNOSIS — I10 ESSENTIAL HYPERTENSION: ICD-10-CM

## 2021-06-16 DIAGNOSIS — Z23 ENCOUNTER FOR IMMUNIZATION: ICD-10-CM

## 2021-06-16 DIAGNOSIS — R35.1 NOCTURIA: ICD-10-CM

## 2021-06-16 DIAGNOSIS — K22.719 BARRETT'S ESOPHAGUS WITH DYSPLASIA: Primary | ICD-10-CM

## 2021-06-16 DIAGNOSIS — Z12.5 PROSTATE CANCER SCREENING: ICD-10-CM

## 2021-06-16 DIAGNOSIS — Z11.59 ENCOUNTER FOR HEPATITIS C SCREENING TEST FOR LOW RISK PATIENT: ICD-10-CM

## 2021-06-16 PROBLEM — R73.03 PREDIABETES: Status: ACTIVE | Noted: 2017-05-25

## 2021-06-16 PROBLEM — E78.00 HYPERCHOLESTEREMIA: Status: ACTIVE | Noted: 2017-05-25

## 2021-06-16 PROCEDURE — G8753 SYS BP > OR = 140: HCPCS | Performed by: NURSE PRACTITIONER

## 2021-06-16 PROCEDURE — G0009 ADMIN PNEUMOCOCCAL VACCINE: HCPCS | Performed by: NURSE PRACTITIONER

## 2021-06-16 PROCEDURE — 90732 PPSV23 VACC 2 YRS+ SUBQ/IM: CPT | Performed by: NURSE PRACTITIONER

## 2021-06-16 PROCEDURE — G8419 CALC BMI OUT NRM PARAM NOF/U: HCPCS | Performed by: NURSE PRACTITIONER

## 2021-06-16 PROCEDURE — G9711 PT HX TOT COL OR COLON CA: HCPCS | Performed by: NURSE PRACTITIONER

## 2021-06-16 PROCEDURE — G8427 DOCREV CUR MEDS BY ELIG CLIN: HCPCS | Performed by: NURSE PRACTITIONER

## 2021-06-16 PROCEDURE — G9717 DOC PT DX DEP/BP F/U NT REQ: HCPCS | Performed by: NURSE PRACTITIONER

## 2021-06-16 PROCEDURE — G8536 NO DOC ELDER MAL SCRN: HCPCS | Performed by: NURSE PRACTITIONER

## 2021-06-16 PROCEDURE — 99214 OFFICE O/P EST MOD 30 MIN: CPT | Performed by: NURSE PRACTITIONER

## 2021-06-16 PROCEDURE — G8755 DIAS BP > OR = 90: HCPCS | Performed by: NURSE PRACTITIONER

## 2021-06-16 PROCEDURE — 1101F PT FALLS ASSESS-DOCD LE1/YR: CPT | Performed by: NURSE PRACTITIONER

## 2021-06-16 RX ORDER — PANTOPRAZOLE SODIUM 40 MG/1
40 TABLET, DELAYED RELEASE ORAL DAILY
Qty: 90 TABLET | Refills: 1 | Status: SHIPPED | OUTPATIENT
Start: 2021-06-16

## 2021-06-16 NOTE — PROGRESS NOTES
Pt. Is here for intermittent abd. Pain, Hx of Baldwin's wants GI referral. Pt. Will check BP home tonight and in the morning and send readings, if remains elevated may need med. Change and cardiology referral.Lalo Gómez  is a 78 y.o. @  who present for routine immunizations. Prior to vaccine administration: Consent was obtained. Risks and adverse reactions were discussed. The patient was provided the VIS and they were given an opportunity to ask questions; all questions were addressed. He  denies any symptoms, reactions or allergies that would exclude them from being immunized today. There were no adverse reactions observed post vaccination. Patient was advised to seek medical or call the office with any questions or concerns post vaccination. Patient verbalized understanding.    Ky Hartley LPN

## 2021-06-16 NOTE — PROGRESS NOTES
Subjective:      Elva Momin is a 78 y.o. male who presents today for abdominal pain. He had his medicare wellness with [facility] in March. Abdominal Pain: symptoms started years ago, they include intermittent pain with certain foods. Eating late at night or fatty foods will make the pain worse. Pain is described as burning to mid epigastric. Hx of contreras's esophagus. He denies vomiting, fevers, diarrhea, constiaption. Laying down makes symptoms worse. Cardiovascular Review  The patient has hypertension. He reports taking medications as instructed, no medication side effects noted. He generally follows a low fat low cholesterol diet, generally follows a low sodium diet, exercises regularly. BP Readings from Last 3 Encounters:   06/16/21 (!) 180/110   09/16/19 140/84   07/16/19 120/72       Patient Active Problem List    Diagnosis Date Noted    Contreras esophagus 02/12/2019    History of colon polyps 02/12/2019    S/P colon resection 02/12/2019    [facility] of [facility]    Essential hypertension 12/07/2018     Current Outpatient Medications   Medication Sig Dispense Refill    buPROPion XL (WELLBUTRIN XL) 300 mg XL tablet TAKE ONE TABLET BY MOUTH EVERY MORNING. APPOINTMENT NEEDED BEFORE ADDITIONAL REFILLS 30 Tab 0    aspirin delayed-release 81 mg tablet Take 1 Tab by mouth daily. 30 Tab 0    lisinopril (PRINIVIL, ZESTRIL) 20 mg tablet Take 1 Tab by mouth daily. 90 Tab 3    omeprazole (PRILOSEC) 40 mg capsule TAKE 1 CAPSULE(S) EVERY DAY BY ORAL ROUTE  DAYS. Allergies   Allergen Reactions    Pcn [Penicillins] Unknown (comments)     No past medical history on file. Past Surgical History:   Procedure Laterality Date    HX ROTATOR CUFF REPAIR      HX TOTAL COLECTOMY          Review of Systems    A comprehensive review of systems was negative except for that written in the HPI.      Objective:     Visit Vitals  BP (!) 180/110 (BP 1 Location: Left upper arm, BP Patient Position: Sitting, BP Cuff Size: Adult)   Pulse 72   Temp 98.4 °F (36.9 °C) (Temporal)   Resp 16   Ht 6' 5\" (1.956 m)   Wt 237 lb 3.2 oz (107.6 kg)   SpO2 98%   BMI 28.13 kg/m²     General appearance: alert, cooperative, no distress, appears stated age  Head: Normocephalic, without obvious abnormality, atraumatic  Eyes: conjunctivae/corneas clear. PERRL, EOM's intact. Fundi benign  Lungs: clear to auscultation bilaterally  Heart: regular rate and rhythm, S1, S2 normal, no murmur, click, rub or gallop  Chest wall: no abnormalities noted  Abdomen: soft, non-tender. Bowel sounds normal. No masses,  no organomegaly  Extremities: extremities normal, atraumatic, no cyanosis or edema  Pulses: 2+ and symmetric  Skin: Skin color, texture, turgor normal. No rashes or lesions  Neurologic: Grossly normal  Nursing note and vitals reviewed  Assessment/Plan:     1. Baldwin's esophagus with dysplasia  - REFERRAL TO GASTROENTEROLOGY    2. Episode of recurrent major depressive disorder, unspecified depression episode severity ([facility]  - he notes this is well controlled     3. Essential hypertension  - BP uncontrolled, he will recheck BP tonight and tomorrow and message with readings. - CBC WITH AUTOMATED DIFF; Future  - LIPID PANEL; Future  - METABOLIC PANEL, COMPREHENSIVE; Future  - CBC WITH AUTOMATED DIFF  - LIPID PANEL  - METABOLIC PANEL, COMPREHENSIVE    4. Gastroesophageal reflux disease without esophagitis  - will trial protonix in replacement of the prilosec  - pantoprazole (PROTONIX) 40 mg tablet; Take 1 Tablet by mouth daily. Dispense: 90 Tablet; Refill: 1    5. Encounter for immunization  - PNEUMOCOCCAL POLYSACCHARIDE VACCINE, 23-VALENT, ADULT OR IMMUNOSUPPRESSED PT DOSE,    6. Prostate cancer screening  - PSA W/ REFLX FREE PSA    7. Encounter for hepatitis C screening test for low risk patient  - HEPATITIS C AB; Future  - HEPATITIS C AB    8.  Nocturia  - PSA W/ REFLX FREE PSA    Follow-up and Dispositions    · Return for Message BP readings tomorrow. Advised him to call back or return to office if symptoms worsen/change/persist.  Discussed expected course/resolution/complications of diagnosis in detail with patient. Medication risks/benefits/costs/interactions/alternatives discussed with patient. He was given an after visit summary which includes diagnoses, current medications, & vitals. He expressed understanding with the diagnosis and plan.

## 2021-06-18 DIAGNOSIS — I10 ESSENTIAL HYPERTENSION: ICD-10-CM

## 2021-06-18 RX ORDER — LISINOPRIL 20 MG/1
20 TABLET ORAL DAILY
Qty: 90 TABLET | Refills: 3 | Status: SHIPPED | OUTPATIENT
Start: 2021-06-18

## 2022-03-18 PROBLEM — I10 ESSENTIAL HYPERTENSION: Status: ACTIVE | Noted: 2018-12-07

## 2022-03-18 PROBLEM — K22.70 BARRETT ESOPHAGUS: Status: ACTIVE | Noted: 2019-02-12

## 2022-03-19 PROBLEM — R73.03 PREDIABETES: Status: ACTIVE | Noted: 2017-05-25

## 2022-03-19 PROBLEM — Z86.010 HISTORY OF COLON POLYPS: Status: ACTIVE | Noted: 2019-02-12

## 2022-03-19 PROBLEM — F33.9 EPISODE OF RECURRENT MAJOR DEPRESSIVE DISORDER (HCC): Status: ACTIVE | Noted: 2018-12-07

## 2022-03-19 PROBLEM — Z90.49 S/P COLON RESECTION: Status: ACTIVE | Noted: 2019-02-12

## 2022-03-20 PROBLEM — E78.00 HYPERCHOLESTEREMIA: Status: ACTIVE | Noted: 2017-05-25

## 2023-05-19 RX ORDER — BUPROPION HYDROCHLORIDE 300 MG/1
TABLET ORAL
COMMUNITY
Start: 2021-01-04

## 2023-05-19 RX ORDER — ASPIRIN 81 MG/1
81 TABLET ORAL DAILY
COMMUNITY
Start: 2019-09-16

## 2023-05-19 RX ORDER — PANTOPRAZOLE SODIUM 40 MG/1
40 TABLET, DELAYED RELEASE ORAL DAILY
COMMUNITY
Start: 2021-06-16

## 2023-05-19 RX ORDER — LISINOPRIL 20 MG/1
20 TABLET ORAL DAILY
COMMUNITY
Start: 2021-06-18

## 2023-10-14 ASSESSMENT — TONOMETRY
OS_IOP_MMHG: 15
OD_IOP_MMHG: 15

## 2023-10-14 ASSESSMENT — VISUAL ACUITY
OD_CC: 20/20-
OS_CC: 20/40+